# Patient Record
Sex: FEMALE | Race: WHITE | Employment: OTHER | URBAN - METROPOLITAN AREA
[De-identification: names, ages, dates, MRNs, and addresses within clinical notes are randomized per-mention and may not be internally consistent; named-entity substitution may affect disease eponyms.]

---

## 2019-12-31 NOTE — PROGRESS NOTES
Tami is a 24 year old No obstetric history on file. female who presents for annual exam.     Besides routine health maintenance, she has no other health concerns today .  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 Just finished school at [employer] in . Would like to go back on sprintec, used it as OCP and liked it, reports no issues. Due for cycle soon and will restart after that. Received HPV vaccine previously, never has had a pap before, would like STD testing.     Menses are regular q 28-30 days and normal lasting 5 days.   Menses flow: normal.    Patient's last menstrual period was 2019..   Using none for contraception.    She is not currently considering pregnancy.    REPRODUCTIVE/GYNECOLOGIC HISTORY:  Tami is sexually active with male partner(s) and is not currently in monogamous relationship.   STI testing offered?  Accepted  History of abnormal Pap smear:  NA  SOCIAL HISTORY  Abuse: does not report having previously been physical or sexually abused.    Do you feel safe in your environment? YES      She  reports that she has never smoked. She has never used smokeless tobacco.      Last PHQ-9 score on record =   PHQ-9 SCORE 1/3/2020   PHQ-9 Total Score 0     Last GAD7 score on record =   ILIANA-7 SCORE 1/3/2020   Total Score 0     Alcohol Score = 4    HEALTH MAINTENANCE:  Cholesterol: never   Mammo: never . History of abnormal Mammo: Not applicable.  Regular Self Breast Exams: Yes  TSH: (No results found for: TSH )  Pap;  never  Immunizations up to date: yes  (Gardasil, Tdap, Flu)  Health maintenance updated:  yes    HEALTHY HABITS  Eating habits: eats regular meals  Calcium/Vitamin D intake: source:  dairy Adequate?   Exercise: How often do you exercise? 3-5 times/week;Walking, Cardio and yoga  Have you had an eye exam in the last two years? YES    Do you routinely see the dentist once or twice yearly? YES    HISTORY:  OB History    Para Term  AB  Living   0 0 0 0 0 0   SAB TAB Ectopic Multiple Live Births   0 0 0 0 0     Past Medical History:   Diagnosis Date     NO ACTIVE PROBLEMS      Past Surgical History:   Procedure Laterality Date     NO HISTORY OF SURGERY       Family History   Problem Relation Age of Onset     Thyroid Disease Mother      Hypertension Paternal Grandmother      Diabetes Paternal Grandfather      Breast Cancer Maternal Aunt      Social History     Socioeconomic History     Marital status: Single     Spouse name: Not on file     Number of children: Not on file     Years of education: Not on file     Highest education level: Not on file   Occupational History     Not on file   Social Needs     Financial resource strain: Not on file     Food insecurity:     Worry: Not on file     Inability: Not on file     Transportation needs:     Medical: Not on file     Non-medical: Not on file   Tobacco Use     Smoking status: Not on file   Substance and Sexual Activity     Alcohol use: Not on file     Drug use: Not on file     Sexual activity: Not on file   Lifestyle     Physical activity:     Days per week: Not on file     Minutes per session: Not on file     Stress: Not on file   Relationships     Social connections:     Talks on phone: Not on file     Gets together: Not on file     Attends Sabianist service: Not on file     Active member of club or organization: Not on file     Attends meetings of clubs or organizations: Not on file     Relationship status: Not on file     Intimate partner violence:     Fear of current or ex partner: Not on file     Emotionally abused: Not on file     Physically abused: Not on file     Forced sexual activity: Not on file   Other Topics Concern     Not on file   Social History Narrative     Not on file       Current Outpatient Medications:      norgestimate-ethinyl estradiol (ORTHO-CYCLEN/SPRINTEC) 0.25-35 MG-MCG tablet, Take 1 tablet by mouth daily, Disp: 84 tablet, Rfl: 3   No Known Allergies    Past medical,  "surgical, social and family history were reviewed and updated in EPIC.    ROS:   12 point review of systems negative other than symptoms noted below or in the HPI.    PHYSICAL EXAM:  BP 92/68   Pulse 64   Ht 1.702 m (5' 7\")   Wt 56.2 kg (124 lb)   LMP 12/02/2019   BMI 19.42 kg/m     BMI: Body mass index is 19.42 kg/m .  Constitutional: healthy, alert and no distress  Neck: symmetrical, thyroid normal size, no masses present, no lymphadenopathy present.   Cardiovascular: RRR, no murmurs present  Respiratory: breathing unlabored, lungs CTA bilaterally  Breast:normal without masses, tenderness or nipple discharge and no palpable axillary masses or adenopathy  Gastrointestinal: abdomen soft, non-tender, bowel sounds present  PELVIC EXAM:  Vulva: No lesions, no adenopathy, BUS WNL  Vagina: Moist, pink, discharge normal  well rugated, no lesions  Cervix:smooth, pink, no visible lesions  Uterus: Normal size, non-tender, mobile  Ovaries: No masses palpated  Rectal exam: deferred  Skin: small, skin colored round lesions with defined margins present on pelvic/inner left thigh area, non tender    ASSESSMENT/PLAN:    ICD-10-CM    1. Encounter for gynecological examination with abnormal finding Z01.411 Pap imaged thin layer screen only - recommended age 21 - 24 years   2. Screen for STD (sexually transmitted disease) Z11.3 NEISSERIA GONORRHOEA PCR   3. Screening for chlamydial disease Z11.8 CHLAMYDIA TRACHOMATIS PCR   4. OCP (oral contraceptive pills) initiation Z30.011 norgestimate-ethinyl estradiol (ORTHO-CYCLEN/SPRINTEC) 0.25-35 MG-MCG tablet   5. Molluscum contagiosum B08.1 DERMATOLOGY REFERRAL     COUNSELING:   Reviewed preventive health counseling, as reflected in patient instructions       Regular exercise       Healthy diet/nutrition       Vision screening       Contraception       Safe sex practices/STD prevention   reports that she has never smoked. She has never used smokeless tobacco.   Reviewed pelvic exams, " showed patient speculum, reviewed pap guidelines  Discussed bumps on pelvic area/inner thigh are likely  molluscum contagiosum, uniform skin color appearance, recommend covering with bandage and avoiding intercourse until she can see dermatology  Referral placed for [facility]    In addition to the preventive visit, 10 minutes of the appointment were spent evaluating and developing a treatment plan for her additional concern(s).        KAREN De La Cruz, CNM

## 2020-01-03 ENCOUNTER — OFFICE VISIT (OUTPATIENT)
Dept: MIDWIFE SERVICES | Facility: CLINIC | Age: 25
End: 2020-01-03
Payer: COMMERCIAL

## 2020-01-03 VITALS
WEIGHT: 124 LBS | BODY MASS INDEX: 19.46 KG/M2 | SYSTOLIC BLOOD PRESSURE: 92 MMHG | HEART RATE: 64 BPM | HEIGHT: 67 IN | DIASTOLIC BLOOD PRESSURE: 68 MMHG

## 2020-01-03 DIAGNOSIS — Z01.411 ENCOUNTER FOR GYNECOLOGICAL EXAMINATION WITH ABNORMAL FINDING: Primary | ICD-10-CM

## 2020-01-03 DIAGNOSIS — Z30.011 OCP (ORAL CONTRACEPTIVE PILLS) INITIATION: ICD-10-CM

## 2020-01-03 DIAGNOSIS — Z11.8 SCREENING FOR CHLAMYDIAL DISEASE: ICD-10-CM

## 2020-01-03 DIAGNOSIS — Z11.3 SCREEN FOR STD (SEXUALLY TRANSMITTED DISEASE): ICD-10-CM

## 2020-01-03 DIAGNOSIS — B08.1 MOLLUSCUM CONTAGIOSUM: ICD-10-CM

## 2020-01-03 PROCEDURE — G0145 SCR C/V CYTO,THINLAYER,RESCR: HCPCS | Performed by: ADVANCED PRACTICE MIDWIFE

## 2020-01-03 PROCEDURE — 99385 PREV VISIT NEW AGE 18-39: CPT | Performed by: ADVANCED PRACTICE MIDWIFE

## 2020-01-03 PROCEDURE — 87591 N.GONORRHOEAE DNA AMP PROB: CPT | Performed by: ADVANCED PRACTICE MIDWIFE

## 2020-01-03 PROCEDURE — 99212 OFFICE O/P EST SF 10 MIN: CPT | Mod: 25 | Performed by: ADVANCED PRACTICE MIDWIFE

## 2020-01-03 PROCEDURE — 87491 CHLMYD TRACH DNA AMP PROBE: CPT | Performed by: ADVANCED PRACTICE MIDWIFE

## 2020-01-03 RX ORDER — NORGESTIMATE AND ETHINYL ESTRADIOL 0.25-0.035
1 KIT ORAL DAILY
Qty: 84 TABLET | Refills: 3 | Status: SHIPPED | OUTPATIENT
Start: 2020-01-03 | End: 2020-12-07

## 2020-01-03 ASSESSMENT — PATIENT HEALTH QUESTIONNAIRE - PHQ9
SUM OF ALL RESPONSES TO PHQ QUESTIONS 1-9: 0
5. POOR APPETITE OR OVEREATING: NOT AT ALL

## 2020-01-03 ASSESSMENT — ANXIETY QUESTIONNAIRES
5. BEING SO RESTLESS THAT IT IS HARD TO SIT STILL: NOT AT ALL
6. BECOMING EASILY ANNOYED OR IRRITABLE: NOT AT ALL
1. FEELING NERVOUS, ANXIOUS, OR ON EDGE: NOT AT ALL
7. FEELING AFRAID AS IF SOMETHING AWFUL MIGHT HAPPEN: NOT AT ALL
2. NOT BEING ABLE TO STOP OR CONTROL WORRYING: NOT AT ALL
3. WORRYING TOO MUCH ABOUT DIFFERENT THINGS: NOT AT ALL
GAD7 TOTAL SCORE: 0

## 2020-01-03 ASSESSMENT — MIFFLIN-ST. JEOR: SCORE: 1355.09

## 2020-01-03 NOTE — LETTER
January 9, 2020      Tami Ayala  [address] [address]    Dear ,      I am happy to inform you that your recent cervical cancer screening test (PAP smear) was normal.      Preventative screenings such as this help to ensure your health for years to come. You should repeat a pap smear in 3 years, unless otherwise directed.      You will still need to return to the clinic every year for your annual exam and other preventive tests.     If you have additional questions regarding this result, please call our registered nurse, Pretty at [phone].      Sincerely,      KAREN Fitzpatrick CNM

## 2020-01-03 NOTE — PATIENT INSTRUCTIONS
Preventive Health Recommendations  Female Ages 21 - 39  Yearly exam:   See your health care provider every year in order to  1. Review health changes.  2. Discuss preventive care.    3. Review your medicines if you your provider has prescribed any.      You do not need a Pap test if your uterus was removed (hysterectomy) and you have not had cancer.    You should be tested each year for STIs (sexually transmitted diseases) if you're at risk.     Talk to your provider about how often to have your cholesterol checked, about every 5 years if normal.    If you are at risk for diabetes, you should have a diabetes test (fasting glucose).    Vitamin D deficiency screening and thyroid disease screening is also recommended every 3-5 years depending on risk factors or more often if symptomatic  PAP Smear:   Until age 30: Get a Pap test every three years (more often if you have had an abnormal result)    After age 30: Talk to your provider about whether you should have a Pap test every 3 years or have a Pap test with HPV screening every 5 years.   Shots: Get a flu shot each year. Get a tetanus shot every 10 years.   Nutrition:     Eat at least 5 servings of fruits and vegetables each day    Eat REAL food, stay away from processed foods.    Eat whole-grain bread, whole-wheat pasta and brown rice instead of white grains and rice.    For bone health:  Eat calcium-rich foods or take calcium pills (500 to 600 mg) twice a day with food (1200 mg per day). Also take vitamin D (2000 IUs) each day.     Lifestyle    Exercise regularly (30 minutes a day, 5 days of the week). This will help you control your weight and prevent disease.    Weight bearing exercise such as weight lifting, walking, running and yoga will be beneficial later in life preventing osteoporosis     Limit alcohol to one drink per day.    No smoking.     Wear sunscreen to prevent skin cancer.    See your dentist every six months to one year for an exam and cleaning  depending on their recommendation    Get an eye exam every two years or more often if you wear glasses or contacts.      Birth Control Pills    Combination birth control pills contain both estrogen and progestin.  There are numerous brands of birth control pills otherwise known as oral contraceptive pills (OCP's).  Each brand has a different combination of estrogen and progestin so every woman can find the one that is right for her.  OCP's are a safe and effective way to prevent pregnancy in most women.    How do OCP's work  OCP's work by several different mechanisms.  They cause changes in the cervix and the lining of the uterus.  The cervical mucus becomes thicker which will prevent the sperm from entering the cervix.  The lining of the uterus becomes thin which helps prevent an egg from attaching to it.  In combination, these events make it unlikely that you will get pregnant. It may also prevent ovulation completely.    Benefits of OCP's  May reduce your risk of:  Cancer of the uterus and ovary, ovarian cysts, pelvic infection, bone loss, benign breast disease, anemia, ectopic pregnancy and acne.  It may also decrease symptoms of PCOS (Polycystic Ovarian Syndrome). OCP's may also improve cramping during menstrual cycle and may make you cycle shorter and lighter.    How to take OCP's  You have several choices on how to start taking your OCP's:    You can start the pill on the first day of your next period    You can start the pill on the Sunday after your next period starts    You can start the pill on the first day it was prescribed no matter where you are in your cycle.  In this case, you will need to make sure you are not pregnant.    No matter when you start your first pack, you will always start your next pack on the same day you started your first pack.    You should take the pill at the same time every day.  Do not skip any pills.  If you miss any pills, are taking antibiotics or vomit, use a backup method  of birth control until you get your next period.    Pills come in packs of 21, 28 or 91 pills:      21 Pills:  Take one pill at the same time every day for 21 days.  Wait 7 days before beginning your next pack.  During these 7 days you will have your period.    28 Pills:  Take one pill at the same time every day for 28 days.  The last 7 pills in the pack do not contain estrogen/progestin.  During these 7 days you will have your period.      91 Pills:  Take one pill at the same time every day for 91 days.  The last 7 pills in the pack do not contain estrogen/progestin.  During these 7 days you will have your period.  With this method you will only have 4 periods a year.  Some women eventually have no bleeding at all.    Each pill pack comes with instructions.  Please make sure you read them and understand these instructions.      What to do if you miss a pill    Occasionally you may forget to take a pill or not take it on time.  Take the missed pill as soon as you remember.  Take the next pill at the regular time.  It is ok if you take two pills in one day.  You may feel a bit queasy or have some spotting, this is normal and should not be concerning.  If you have missed more than one pill use a back up method of birth control and call the clinic for instructions on how to proceed.    Who should not take Combined OCP's    If you have a history or have blood clots    A history of cerebral vascular accident (stroke)    If you have ischemic heart or coronary artery disease    Known of suspected breast cancer    Known or suspected pregnancy    Smoker and over age 35    Any know liver abnormality    Migraine headaches with an aura    Undiagnosed abnormal vaginal bleeding    High blood pressure    Common side effects when starting OCP's  Headache, nausea, dizziness, breakthrough bleeding, missed periods, tender breasts, depression and anxiety.  Most side effects are minor and resolve in the first few months. Take the pill  with meals or at bedtime if nausea occurs.    Call or return for care in the following circumstances:      Unexpected missed periods or very heavy bleeding    Persistent vaginal bleeding    Depression    Suspected pregnancy    Persistent side effects such as:  Nausea, irregular menses or mood changes.    Seek emergency care immediately for the following:  ACHES    Abdominal or pelvic pain    Chest pain    Severe headache     Visual disturbances    Severe leg pain or numbness or tingling of extremities    Lastly-    Use of a backup method is recommended for the first cycle    Condoms are recommended to protect against STI's    OCP's are 99% effective if take correctly.    The pill helps to keep your periods regular, lighter and shorter and reduces cramps.  If you desire a pregnancy, you may stop taking your OCPs.     Please call the clinic with questions and concerns  [facility]  [phone]

## 2020-01-04 ASSESSMENT — ANXIETY QUESTIONNAIRES: GAD7 TOTAL SCORE: 0

## 2020-01-07 LAB
C TRACH DNA SPEC QL NAA+PROBE: NEGATIVE
N GONORRHOEA DNA SPEC QL NAA+PROBE: NEGATIVE
SPECIMEN SOURCE: NORMAL
SPECIMEN SOURCE: NORMAL

## 2020-01-08 LAB
COPATH REPORT: NORMAL
PAP: NORMAL

## 2020-06-10 ENCOUNTER — WALK IN (OUTPATIENT)
Dept: URGENT CARE | Age: 25
End: 2020-06-10

## 2020-06-10 DIAGNOSIS — H66.92 LEFT OTITIS MEDIA, UNSPECIFIED OTITIS MEDIA TYPE: ICD-10-CM

## 2020-06-10 DIAGNOSIS — H72.92 PERFORATION OF LEFT TYMPANIC MEMBRANE: Primary | ICD-10-CM

## 2020-06-10 PROCEDURE — 99213 OFFICE O/P EST LOW 20 MIN: CPT | Performed by: PHYSICIAN ASSISTANT

## 2020-06-10 RX ORDER — AMOXICILLIN 875 MG/1
875 TABLET, COATED ORAL 2 TIMES DAILY
Qty: 20 TABLET | Refills: 0 | Status: SHIPPED | OUTPATIENT
Start: 2020-06-10 | End: 2020-06-20

## 2020-06-10 RX ORDER — OFLOXACIN 3 MG/ML
SOLUTION AURICULAR (OTIC)
Qty: 5 ML | Refills: 0 | Status: SHIPPED | OUTPATIENT
Start: 2020-06-10

## 2020-06-10 ASSESSMENT — PAIN SCALES - GENERAL: PAINLEVEL: 5-6

## 2020-12-05 DIAGNOSIS — Z30.011 OCP (ORAL CONTRACEPTIVE PILLS) INITIATION: ICD-10-CM

## 2020-12-07 RX ORDER — NORGESTIMATE AND ETHINYL ESTRADIOL 0.25-0.035
1 KIT ORAL DAILY
Qty: 84 TABLET | Refills: 0 | Status: SHIPPED | OUTPATIENT
Start: 2020-12-07 | End: 2021-03-01

## 2020-12-07 NOTE — TELEPHONE ENCOUNTER
Medication is being filled for 1 time refill only due to:  Patient needs to be seen because due for annual in January.  Tiarra Capps RN on 12/7/2020 at 8:55 AM

## 2020-12-07 NOTE — TELEPHONE ENCOUNTER
"Requested Prescriptions   Pending Prescriptions Disp Refills     ESTARYLLA 0.25-35 MG-MCG tablet [Pharmacy Med Name: ESTARYLLA TABLETS 28S] 84 tablet 3     Sig: TAKE 1 TABLET BY MOUTH DAILY       Contraceptives Protocol Passed - 12/5/2020  3:30 AM        Passed - Patient is not a current smoker if age is 35 or older        Passed - Recent (12 mo) or future (30 days) visit within the authorizing provider's specialty     Patient has had an office visit with the authorizing provider or a provider within the authorizing providers department within the previous 12 mos or has a future within next 30 days. See \"Patient Info\" tab in inbasket, or \"Choose Columns\" in Meds & Orders section of the refill encounter.              Passed - Medication is active on med list        Passed - No active pregnancy on record        Passed - No positive pregnancy test in past 12 months           Last Written Prescription Date:  1/3/20  Last Fill Quantity: 84,  # refills: 3   Last office visit: 1/3/2020 with prescribing provider:  Katharina Coronado CNM   Future Office Visit:  None          "

## 2021-03-01 DIAGNOSIS — Z30.011 OCP (ORAL CONTRACEPTIVE PILLS) INITIATION: ICD-10-CM

## 2021-03-01 RX ORDER — NORGESTIMATE AND ETHINYL ESTRADIOL 0.25-0.035
KIT ORAL
Qty: 28 TABLET | Refills: 0 | Status: SHIPPED | OUTPATIENT
Start: 2021-03-01 | End: 2021-06-24

## 2021-03-01 NOTE — TELEPHONE ENCOUNTER
"Requested Prescriptions   Pending Prescriptions Disp Refills     ESTARYLLA 0.25-35 MG-MCG tablet [Pharmacy Med Name: ESTARYLLA TABLETS 28S] 84 tablet 0     Sig: TAKE 1 TABLET BY MOUTH DAILY       Contraceptives Protocol Failed - 3/1/2021  3:32 AM        Failed - Recent (12 mo) or future (30 days) visit within the authorizing provider's specialty     Patient has had an office visit with the authorizing provider or a provider within the authorizing providers department within the previous 12 mos or has a future within next 30 days. See \"Patient Info\" tab in inbasket, or \"Choose Columns\" in Meds & Orders section of the refill encounter.              Passed - Patient is not a current smoker if age is 35 or older        Passed - Medication is active on med list        Passed - No active pregnancy on record        Passed - No positive pregnancy test in past 12 months           Medication is being filled for 1 time refill only due to:  appointment needed for further refills   Petra Fong RN on 3/1/2021 at 8:28 AM      "

## 2021-04-03 DIAGNOSIS — Z30.011 OCP (ORAL CONTRACEPTIVE PILLS) INITIATION: ICD-10-CM

## 2021-04-04 RX ORDER — NORGESTIMATE AND ETHINYL ESTRADIOL 0.25-0.035
KIT ORAL
Qty: 28 TABLET | Refills: 0 | OUTPATIENT
Start: 2021-04-04

## 2021-04-04 NOTE — TELEPHONE ENCOUNTER
"Requested Prescriptions   Pending Prescriptions Disp Refills     JUVENCIO 0.25-35 MG-MCG tablet [Pharmacy Med Name: JUVENCIO TABLETS 28S] 28 tablet 0     Sig: TAKE 1 TABLET BY MOUTH DAILY       Contraceptives Protocol Failed - 4/3/2021  3:32 AM        Failed - Recent (12 mo) or future (30 days) visit within the authorizing provider's specialty     Patient has had an office visit with the authorizing provider or a provider within the authorizing providers department within the previous 12 mos or has a future within next 30 days. See \"Patient Info\" tab in inbasket, or \"Choose Columns\" in Meds & Orders section of the refill encounter.              Passed - Patient is not a current smoker if age is 35 or older        Passed - Medication is active on med list        Passed - No active pregnancy on record        Passed - No positive pregnancy test in past 12 months           Last Written Prescription Date:  3/1/21  Last Fill Quantity: 28,  # refills: 0   Last office visit: 1/3/2020 with prescribing provider:  ERIC Coronado CNM   Future Office Visit:      Pt due for annual, no appt scheduled. Pt already received one month extension. Rx denied.   Kelsi Dooley RN on 4/4/2021 at 9:12 AM        "

## 2021-04-12 DIAGNOSIS — Z30.011 OCP (ORAL CONTRACEPTIVE PILLS) INITIATION: ICD-10-CM

## 2021-04-12 RX ORDER — NORGESTIMATE AND ETHINYL ESTRADIOL 0.25-0.035
KIT ORAL
Qty: 28 TABLET | Refills: 0 | OUTPATIENT
Start: 2021-04-12

## 2021-04-12 NOTE — TELEPHONE ENCOUNTER
"Requested Prescriptions   Pending Prescriptions Disp Refills     JUVENCIO 0.25-35 MG-MCG tablet [Pharmacy Med Name: JUVENCIO TABLETS 28S] 28 tablet 0     Sig: TAKE 1 TABLET BY MOUTH DAILY       Contraceptives Protocol Failed - 4/12/2021 12:34 PM        Failed - Recent (12 mo) or future (30 days) visit within the authorizing provider's specialty     Patient has had an office visit with the authorizing provider or a provider within the authorizing providers department within the previous 12 mos or has a future within next 30 days. See \"Patient Info\" tab in inbasket, or \"Choose Columns\" in Meds & Orders section of the refill encounter.              Passed - Patient is not a current smoker if age is 35 or older        Passed - Medication is active on med list        Passed - No active pregnancy on record        Passed - No positive pregnancy test in past 12 months           Pt due for annual, no appt scheduled. Pt already received one month extension. Rx denied.   Petra Fong RN on 4/12/2021 at 12:41 PM    "

## 2021-05-24 VITALS
DIASTOLIC BLOOD PRESSURE: 70 MMHG | OXYGEN SATURATION: 99 % | BODY MASS INDEX: 19.93 KG/M2 | HEIGHT: 67 IN | TEMPERATURE: 97.9 F | SYSTOLIC BLOOD PRESSURE: 112 MMHG | HEART RATE: 66 BPM | RESPIRATION RATE: 16 BRPM | WEIGHT: 127 LBS

## 2021-06-21 ENCOUNTER — MYC REFILL (OUTPATIENT)
Dept: MIDWIFE SERVICES | Facility: CLINIC | Age: 26
End: 2021-06-21

## 2021-06-21 DIAGNOSIS — Z30.011 OCP (ORAL CONTRACEPTIVE PILLS) INITIATION: ICD-10-CM

## 2021-06-21 RX ORDER — NORGESTIMATE AND ETHINYL ESTRADIOL 0.25-0.035
1 KIT ORAL DAILY
Qty: 28 TABLET | Refills: 0 | OUTPATIENT
Start: 2021-06-21

## 2021-06-21 NOTE — TELEPHONE ENCOUNTER
"Requested Prescriptions   Pending Prescriptions Disp Refills     norgestimate-ethinyl estradiol (ESTARYLLA) 0.25-35 MG-MCG tablet 28 tablet 0     Sig: Take 1 tablet by mouth daily       Contraceptives Protocol Failed - 6/21/2021  9:10 AM        Failed - Recent (12 mo) or future (30 days) visit within the authorizing provider's specialty     Patient has had an office visit with the authorizing provider or a provider within the authorizing providers department within the previous 12 mos or has a future within next 30 days. See \"Patient Info\" tab in inbasket, or \"Choose Columns\" in Meds & Orders section of the refill encounter.              Passed - Patient is not a current smoker if age is 35 or older        Passed - Medication is active on med list        Passed - No active pregnancy on record        Passed - No positive pregnancy test in past 12 months           Rx denied  Appointment needed for further refills  Petra Fong RN on 6/21/2021 at 9:34 AM    "

## 2021-06-24 ENCOUNTER — OFFICE VISIT (OUTPATIENT)
Dept: MIDWIFE SERVICES | Facility: CLINIC | Age: 26
End: 2021-06-24
Payer: COMMERCIAL

## 2021-06-24 VITALS
WEIGHT: 122.4 LBS | HEART RATE: 74 BPM | BODY MASS INDEX: 19.21 KG/M2 | HEIGHT: 67 IN | DIASTOLIC BLOOD PRESSURE: 56 MMHG | SYSTOLIC BLOOD PRESSURE: 100 MMHG

## 2021-06-24 DIAGNOSIS — Z11.8 SCREENING FOR CHLAMYDIAL DISEASE: ICD-10-CM

## 2021-06-24 DIAGNOSIS — Z30.011 OCP (ORAL CONTRACEPTIVE PILLS) INITIATION: ICD-10-CM

## 2021-06-24 DIAGNOSIS — Z11.3 SCREEN FOR STD (SEXUALLY TRANSMITTED DISEASE): Primary | ICD-10-CM

## 2021-06-24 PROCEDURE — 99395 PREV VISIT EST AGE 18-39: CPT | Performed by: ADVANCED PRACTICE MIDWIFE

## 2021-06-24 PROCEDURE — 87591 N.GONORRHOEAE DNA AMP PROB: CPT | Performed by: ADVANCED PRACTICE MIDWIFE

## 2021-06-24 PROCEDURE — 87491 CHLMYD TRACH DNA AMP PROBE: CPT | Performed by: ADVANCED PRACTICE MIDWIFE

## 2021-06-24 RX ORDER — NORGESTIMATE AND ETHINYL ESTRADIOL 0.25-0.035
1 KIT ORAL DAILY
Qty: 140 TABLET | Refills: 3 | Status: SHIPPED | OUTPATIENT
Start: 2021-06-24 | End: 2022-06-24

## 2021-06-24 ASSESSMENT — ANXIETY QUESTIONNAIRES
IF YOU CHECKED OFF ANY PROBLEMS ON THIS QUESTIONNAIRE, HOW DIFFICULT HAVE THESE PROBLEMS MADE IT FOR YOU TO DO YOUR WORK, TAKE CARE OF THINGS AT HOME, OR GET ALONG WITH OTHER PEOPLE: NOT DIFFICULT AT ALL
1. FEELING NERVOUS, ANXIOUS, OR ON EDGE: SEVERAL DAYS
7. FEELING AFRAID AS IF SOMETHING AWFUL MIGHT HAPPEN: NOT AT ALL
6. BECOMING EASILY ANNOYED OR IRRITABLE: NOT AT ALL
GAD7 TOTAL SCORE: 4
3. WORRYING TOO MUCH ABOUT DIFFERENT THINGS: SEVERAL DAYS
2. NOT BEING ABLE TO STOP OR CONTROL WORRYING: SEVERAL DAYS
5. BEING SO RESTLESS THAT IT IS HARD TO SIT STILL: NOT AT ALL

## 2021-06-24 ASSESSMENT — PATIENT HEALTH QUESTIONNAIRE - PHQ9
SUM OF ALL RESPONSES TO PHQ QUESTIONS 1-9: 0
5. POOR APPETITE OR OVEREATING: SEVERAL DAYS

## 2021-06-24 ASSESSMENT — MIFFLIN-ST. JEOR: SCORE: 1342.83

## 2021-06-24 NOTE — PROGRESS NOTES
Tami is a 25 year old  female who presents for annual exam.     Besides routine health maintenance, she has no other health concerns today .  HPI:  Last year has been a little stressful, mostly with new sales job. Has a supportive roommate, and supportive family (but not in town). Got a new dog!  Menses are regular q 28-30 days and normal and regular lasting 5 days.   Menses flow: normal.    Patient's last menstrual period was 2021..   Using oral contraceptives for contraception.    She is not currently considering pregnancy.    REPRODUCTIVE/GYNECOLOGIC HISTORY:  Tami is sexually active with male partner(s) and is not currently in monogamous relationship.   STI testing offered?  Accepts  History of abnormal Pap smear:  No  SOCIAL HISTORY  Wears seatbelt  No bike  Wears sunscreen  No guns in the house  Safe in her relationships    She  reports that she has never smoked. She has never used smokeless tobacco.      Last PHQ-9 score on record =   PHQ-9 SCORE 2021   PHQ-9 Total Score 0     Last GAD7 score on record =   ILIANA-7 SCORE 2021   Total Score 4     Alcohol Score = 4    HEALTH MAINTENANCE:  History of abnormal lipids: No  Mammo: N/a . History of abnormal Mammo: Not applicable, Age 42.  Regular Self Breast Exams: Yes  Pap;   Lab Results   Component Value Date    PAP NIL 2020     Immunizations up to date: Unsure of Tdap, will check with PCP. Needs to get COVID-19 vaccine, aware, willing.  Health maintenance updated:  yes    HEALTHY HABITS  Eating habits: follows a balanced nutrition diet and has adequate calcium intake  Calcium/Vitamin D intake: source:  dairy Adequate?   Exercise: How often do you exercise? 5-7 times/week; Walking and pilates      HISTORY:  OB History    Para Term  AB Living   0 0 0 0 0 0   SAB TAB Ectopic Multiple Live Births   0 0 0 0 0     Past Medical History:   Diagnosis Date     NO ACTIVE PROBLEMS      Past Surgical History:   Procedure Laterality  Date     NO HISTORY OF SURGERY       Family History   Problem Relation Age of Onset     Thyroid Disease Mother      Hypertension Paternal Grandmother      Diabetes Paternal Grandfather      Breast Cancer Maternal Aunt      Social History     Socioeconomic History     Marital status: Single     Spouse name: Not on file     Number of children: Not on file     Years of education: Not on file     Highest education level: Not on file   Occupational History     Not on file   Social Needs     Financial resource strain: Not on file     Food insecurity     Worry: Not on file     Inability: Not on file     Transportation needs     Medical: Not on file     Non-medical: Not on file   Tobacco Use     Smoking status: Never Smoker     Smokeless tobacco: Never Used   Substance and Sexual Activity     Alcohol use: Yes     Drug use: Never     Sexual activity: Yes     Partners: Male     Birth control/protection: None   Lifestyle     Physical activity     Days per week: Not on file     Minutes per session: Not on file     Stress: Not on file   Relationships     Social connections     Talks on phone: Not on file     Gets together: Not on file     Attends Latter-day service: Not on file     Active member of club or organization: Not on file     Attends meetings of clubs or organizations: Not on file     Relationship status: Not on file     Intimate partner violence     Fear of current or ex partner: Not on file     Emotionally abused: Not on file     Physically abused: Not on file     Forced sexual activity: Not on file   Other Topics Concern     Parent/sibling w/ CABG, MI or angioplasty before 65F 55M? Not Asked   Social History Narrative     Not on file       Current Outpatient Medications:      ESTARYLLA 0.25-35 MG-MCG tablet, TAKE 1 TABLET BY MOUTH DAILY, Disp: 28 tablet, Rfl: 0   No Known Allergies    Past medical, surgical, social and family history were reviewed and updated in EPIC.    ROS:   12 point review of systems negative  "other than symptoms noted below or in the HPI.    PHYSICAL EXAM:  /56   Pulse 74   Ht 1.702 m (5' 7\")   Wt 55.5 kg (122 lb 6.4 oz)   LMP 06/08/2021   Breastfeeding No   BMI 19.17 kg/m     BMI: Body mass index is 19.17 kg/m .  Constitutional: healthy, alert and no distress  Neck: symmetrical  Cardiovascular: well-perfused on room air  Respiratory: breathing unlabored  Breast: no concerns, deferred  PELVIC EXAM:  Not indicated, deferred    ASSESSMENT/PLAN:    ICD-10-CM    1. OCP (oral contraceptive pills) initiation  Z30.011 norgestimate-ethinyl estradiol (ESTARYLLA) 0.25-35 MG-MCG tablet     No results found for any visits on 06/24/21.      COUNSELING:   -Reviewed option to take OCP continuously-- Tami will consider. Ordered sufficient refills to try this method if desired.  -Advised checking vaccine records for last tdap and if HPV series completed (only one noted in Care Everywhere)-- Tami will look into this  -Tami will look into counseling options for anxiety-- roommate will be a good support for this  -Accepts GC/CT today-- self-collect  -Strongly encouraged Tami to complete her COVID-19 vaccination-- she is not opposed, just \"haven't gotten around to it.\"  Reviewed preventive health counseling, as reflected in patient instructions  Special attention given to:        Regular exercise       Healthy diet/nutrition       Contraception       Safe sex practices/STD prevention   reports that she has never smoked. She has never used smokeless tobacco.    Kelsi Gongora, KAREN, CNM    "

## 2021-06-25 ASSESSMENT — ANXIETY QUESTIONNAIRES: GAD7 TOTAL SCORE: 4

## 2021-06-26 NOTE — RESULT ENCOUNTER NOTE
Natalie Garrett,    Your chlamydia and gonorrhea tests are negative. Please contact us with any questions or concerns.     Thank you,  Farzad Marion, DEANNA, APRN, CNM

## 2021-10-10 ENCOUNTER — HEALTH MAINTENANCE LETTER (OUTPATIENT)
Age: 26
End: 2021-10-10

## 2021-10-25 NOTE — PROGRESS NOTES
SUBJECTIVE: Tami Ayala is a 26 year old female who presents today with UTI symptoms: over the last year has had an increase urge to urinate, is urinating at least every hour during the day and at least 4 times during the night. When she does urinate, the output is little. Does not have any pain, doesn't feel like she is overdrinking, actually thinks she could increase fluids. During day feels like she has to pee urgently as soon as she feels the urge. No burning, low abdominal pain. She does leak urine if she doesn't go right away. Symptoms for the last year.   URINARY TRACT SYMPTOMS     Onset: 1 year ago    Description:   Painful urination (Dysuria): No  Blood in urine (Hematuria): No  Urgency/Frequency: Yes    Progression of Symptoms:  same    Accompanying Signs & Symptoms:  Fever/chills: No  Flank pain: No  Nausea and vomiting: No  Any vaginal symptoms: No  Abdominal/Pelvic Pain: No   History:   History of frequent UTI's: No  History of kidney stones: No  Sexually Active: Yes-last IC 2 weeks ago  Possibility of pregnancy: Don't Know  Contraceptive type:  oral contraceptive-continuous dosing; last menses was in July    Precipitating factors:   none         Therapies Tried and outcome: none      Patient Active Problem List   Diagnosis     OCP (oral contraceptive pills) initiation     Past Medical History:   Diagnosis Date     NO ACTIVE PROBLEMS      Past Surgical History:   Procedure Laterality Date     NO HISTORY OF SURGERY       Current Outpatient Medications   Medication Sig Dispense Refill     nitroFURantoin macrocrystal-monohydrate (MACROBID) 100 MG capsule Take 1 capsule (100 mg) by mouth 2 times daily for 5 days 10 capsule 0     norgestimate-ethinyl estradiol (ESTARYLLA) 0.25-35 MG-MCG tablet Take 1 tablet by mouth daily Skip placebo weeks. 140 tablet 3     No Known Allergies    Health maintenance updated:  yes    ROS:  12 point review of systems negative other than symptoms noted below or in the  "HPI.  Genitourinary: Frequency and Urgency    PHYSICAL EXAM:  Vitals: /68   Pulse 72   Ht 1.702 m (5' 7\")   Wt 55.8 kg (123 lb)   BMI 19.26 kg/m    BMI= Body mass index is 19.26 kg/m .  Patient appears well, afebrile.   ABD: Soft without supra pubic pain or tenderness  BACK: Neg CVAT.     UA RESULTS:  Recent Labs   Lab Test 10/26/21  0948   COLOR Yellow   APPEARANCE Clear   URINEGLC Negative   URINEBILI Negative   URINEKETONE Negative   SG 1.025   UBLD Moderate*   URINEPH 5.0   PROTEIN Negative   UROBILINOGEN 0.2   NITRITE Negative   LEUKEST Moderate*   RBCU 10-25*   WBCU 5-10*         ASSESSMENT/PLAN:      ICD-10-CM    1. Dysuria  R30.0 UA with Microscopic     Urine Culture     UA with Microscopic     Urine Microscopic Exam     Urine Culture     HCG Qual, Urine ([id number])     HCG Qual, Urine ([id number])   2. Acute cystitis with hematuria  N30.01 nitroFURantoin macrocrystal-monohydrate (MACROBID) 100 MG capsule     Results for orders placed or performed in visit on 10/26/21   UA with Microscopic     Status: Abnormal   Result Value Ref Range    Color Urine Yellow Colorless, Straw, Light Yellow, Yellow    Appearance Urine Clear Clear    Glucose Urine Negative Negative mg/dL    Bilirubin Urine Negative Negative    Ketones Urine Negative Negative mg/dL    Specific Gravity Urine 1.025 1.003 - 1.035    Blood Urine Moderate (A) Negative    pH Urine 5.0 5.0 - 7.0    Protein Albumin Urine Negative Negative mg/dL    Urobilinogen Urine 0.2 0.2, 1.0 E.U./dL    Nitrite Urine Negative Negative    Leukocyte Esterase Urine Moderate (A) Negative   Urine Microscopic Exam     Status: Abnormal   Result Value Ref Range    RBC Urine 10-25 (A) 0-2 /HPF /HPF    WBC Urine 5-10 (A) 0-5 /HPF /HPF   HCG Qual, Urine ([id number])     Status: Normal   Result Value Ref Range    hCG Urine Qualitative Negative Negative       Urine was sent for culture.     COUNSELING:  Push fluids    May use Uristat or other OTC med for dysuria  Reinforced the " importance of completing this course of antibiotics   Use a probiotic when taking antibiotics  Handout provided regarding UTI prevention  RTC with continued symptoms or concerns.    Oni Cid CNM

## 2021-10-26 ENCOUNTER — OFFICE VISIT (OUTPATIENT)
Dept: MIDWIFE SERVICES | Facility: CLINIC | Age: 26
End: 2021-10-26
Payer: COMMERCIAL

## 2021-10-26 VITALS
HEART RATE: 72 BPM | BODY MASS INDEX: 19.3 KG/M2 | WEIGHT: 123 LBS | SYSTOLIC BLOOD PRESSURE: 102 MMHG | DIASTOLIC BLOOD PRESSURE: 68 MMHG | HEIGHT: 67 IN

## 2021-10-26 DIAGNOSIS — R30.0 DYSURIA: Primary | ICD-10-CM

## 2021-10-26 DIAGNOSIS — N30.01 ACUTE CYSTITIS WITH HEMATURIA: ICD-10-CM

## 2021-10-26 LAB
ALBUMIN UR-MCNC: NEGATIVE MG/DL
APPEARANCE UR: CLEAR
BILIRUB UR QL STRIP: NEGATIVE
COLOR UR AUTO: YELLOW
GLUCOSE UR STRIP-MCNC: NEGATIVE MG/DL
HCG UR QL: NEGATIVE
HGB UR QL STRIP: ABNORMAL
KETONES UR STRIP-MCNC: NEGATIVE MG/DL
LEUKOCYTE ESTERASE UR QL STRIP: ABNORMAL
NITRATE UR QL: NEGATIVE
PH UR STRIP: 5 [PH] (ref 5–7)
RBC #/AREA URNS AUTO: ABNORMAL /HPF
SP GR UR STRIP: 1.02 (ref 1–1.03)
UROBILINOGEN UR STRIP-ACNC: 0.2 E.U./DL
WBC #/AREA URNS AUTO: ABNORMAL /HPF

## 2021-10-26 PROCEDURE — 99213 OFFICE O/P EST LOW 20 MIN: CPT | Performed by: ADVANCED PRACTICE MIDWIFE

## 2021-10-26 PROCEDURE — 81025 URINE PREGNANCY TEST: CPT | Performed by: ADVANCED PRACTICE MIDWIFE

## 2021-10-26 PROCEDURE — 81001 URINALYSIS AUTO W/SCOPE: CPT | Performed by: ADVANCED PRACTICE MIDWIFE

## 2021-10-26 PROCEDURE — 87086 URINE CULTURE/COLONY COUNT: CPT | Performed by: ADVANCED PRACTICE MIDWIFE

## 2021-10-26 RX ORDER — NITROFURANTOIN 25; 75 MG/1; MG/1
100 CAPSULE ORAL 2 TIMES DAILY
Qty: 10 CAPSULE | Refills: 0 | Status: SHIPPED | OUTPATIENT
Start: 2021-10-26 | End: 2021-10-31

## 2021-10-26 ASSESSMENT — MIFFLIN-ST. JEOR: SCORE: 1340.55

## 2021-10-27 LAB — BACTERIA UR CULT: NO GROWTH

## 2021-11-11 ENCOUNTER — TELEPHONE (OUTPATIENT)
Dept: MIDWIFE SERVICES | Facility: CLINIC | Age: 26
End: 2021-11-11

## 2021-11-11 NOTE — TELEPHONE ENCOUNTER
Pt was here 10/26 for UTI symptoms - labs came back negative for UTI - prescribed 5 days of macrobid. Symptoms went away but came back last night - no pain but frequency increase

## 2021-11-11 NOTE — TELEPHONE ENCOUNTER
Attempted to call pt and vm box full and unable to leave a message.    Kelsi Dooley RN on 11/11/2021 at 9:15 AM

## 2021-11-16 NOTE — PROGRESS NOTES
"SUBJECTIVE: Tami Ayala is a 26 year old female who presents today with UTI symptoms: Pt states that she has been going to the bathroom more frequently. She states that she has to go to the bathroom 2 times a night. She states that she doesnt drink a lot of water.      URINARY TRACT SYMPTOMS     Onset: In the Spring. She was seen for these same symptoms in October.    Description:   Painful urination (Dysuria): No  Blood in urine (Hematuria): No  Urgency/Frequency: Yes Reports when voiding she voids a good amount each time.   Was on a 5 hour car trip a few weeks ago and had to stop 6 times to void. Feels she is voiding every 1-2 hours.  Voids twice overnight  States experiencing incontinence if she does not void almost immediately after feeling the need    Progression of Symptoms:  same and constant    Accompanying Signs & Symptoms:  Fever/chills: No  Flank pain: No  Nausea and vomiting: No  Any vaginal symptoms: No  Abdominal/Pelvic Pain: No   History:   History of frequent UTI's: No  History of kidney stones: No  Sexually Active: Yes  Possibility of pregnancy: No. No history of pregnancy/birth  Contraceptive type:  condoms and oral contraceptive    Precipitating factors:            Therapies Tried and outcome:       Patient Active Problem List   Diagnosis     OCP (oral contraceptive pills) initiation     Past Medical History:   Diagnosis Date     NO ACTIVE PROBLEMS      Past Surgical History:   Procedure Laterality Date     NO HISTORY OF SURGERY       Current Outpatient Medications   Medication Sig Dispense Refill     norgestimate-ethinyl estradiol (ESTARYLLA) 0.25-35 MG-MCG tablet Take 1 tablet by mouth daily Skip placebo weeks. 140 tablet 3     No Known Allergies    Health maintenance updated:  yes    ROS:  12 point review of systems negative other than symptoms noted below or in the HPI.  Genitourinary: Frequency    PHYSICAL EXAM:  Vitals: /64   Ht 1.702 m (5' 7\")   Wt 57.2 kg (126 lb 3.2 oz)   " LMP 10/27/2021 (Exact Date)   Breastfeeding No   BMI 19.77 kg/m    BMI= Body mass index is 19.77 kg/m .  Patient appears well, afebrile.   Exam deferred    No components found for: URINE        ASSESSMENT/PLAN:      ICD-10-CM    1. Urinary urgency  R39.15 Adult Urology Referral   2. Dysuria  R30.0 UA with Microscopic - lab collect     Urine Culture Aerobic Bacterial - lab collect     Urine Microscopic Exam   3. Other urinary incontinence  N39.498 Adult Urology Referral   4. Urinary frequency  R35.0 Adult Urology Referral     Results for orders placed or performed in visit on 11/17/21   UA with Microscopic - lab collect     Status: Abnormal   Result Value Ref Range    Color Urine Yellow Colorless, Straw, Light Yellow, Yellow    Appearance Urine Clear Clear    Glucose Urine Negative Negative mg/dL    Bilirubin Urine Negative Negative    Ketones Urine 40  (A) Negative mg/dL    Specific Gravity Urine >=1.030 1.003 - 1.035    Blood Urine Negative Negative    pH Urine 6.0 5.0 - 7.0    Protein Albumin Urine Negative Negative mg/dL    Urobilinogen Urine 0.2 0.2, 1.0 E.U./dL    Nitrite Urine Negative Negative    Leukocyte Esterase Urine Negative Negative   Urine Microscopic Exam     Status: Abnormal   Result Value Ref Range    Bacteria Urine Few (A) None Seen /HPF    RBC Urine 0-2 0-2 /HPF /HPF    WBC Urine 0-5 0-5 /HPF /HPF    Squamous Epithelials Urine Moderate (A) None Seen /LPF    Mucus Urine Present (A) None Seen /LPF   Urine Culture Aerobic Bacterial - lab collect     Status: None (Preliminary result)    Specimen: Urine, Midstream   Result Value Ref Range    Culture No growth, less than 1 day          COUNSELING:  Explained because her symptoms have been occurring since early spring there is likely an underlying concern  Recommended a consult with urology. Referral placed  UA/UCX sent. Offered further kidney function labs but discussed the Urologist may want additional lab work done and it would be acceptable to  wait    15 minutes spent on the date of the encounter doing chart review, patient visit and documentation       Yesi JONES CNM

## 2021-11-17 ENCOUNTER — OFFICE VISIT (OUTPATIENT)
Dept: MIDWIFE SERVICES | Facility: CLINIC | Age: 26
End: 2021-11-17
Payer: COMMERCIAL

## 2021-11-17 VITALS
WEIGHT: 126.2 LBS | DIASTOLIC BLOOD PRESSURE: 64 MMHG | SYSTOLIC BLOOD PRESSURE: 100 MMHG | BODY MASS INDEX: 19.81 KG/M2 | HEIGHT: 67 IN

## 2021-11-17 DIAGNOSIS — R39.15 URINARY URGENCY: Primary | ICD-10-CM

## 2021-11-17 DIAGNOSIS — R35.0 URINARY FREQUENCY: ICD-10-CM

## 2021-11-17 DIAGNOSIS — R30.0 DYSURIA: Primary | ICD-10-CM

## 2021-11-17 DIAGNOSIS — N39.498 OTHER URINARY INCONTINENCE: ICD-10-CM

## 2021-11-17 DIAGNOSIS — R30.0 DYSURIA: ICD-10-CM

## 2021-11-17 LAB
ALBUMIN UR-MCNC: NEGATIVE MG/DL
APPEARANCE UR: CLEAR
BACTERIA #/AREA URNS HPF: ABNORMAL /HPF
BILIRUB UR QL STRIP: NEGATIVE
COLOR UR AUTO: YELLOW
GLUCOSE UR STRIP-MCNC: NEGATIVE MG/DL
HGB UR QL STRIP: NEGATIVE
KETONES UR STRIP-MCNC: 40 MG/DL
LEUKOCYTE ESTERASE UR QL STRIP: NEGATIVE
MUCOUS THREADS #/AREA URNS LPF: PRESENT /LPF
NITRATE UR QL: NEGATIVE
PH UR STRIP: 6 [PH] (ref 5–7)
RBC #/AREA URNS AUTO: ABNORMAL /HPF
SP GR UR STRIP: >=1.03 (ref 1–1.03)
SQUAMOUS #/AREA URNS AUTO: ABNORMAL /LPF
UROBILINOGEN UR STRIP-ACNC: 0.2 E.U./DL
WBC #/AREA URNS AUTO: ABNORMAL /HPF

## 2021-11-17 PROCEDURE — 99213 OFFICE O/P EST LOW 20 MIN: CPT | Performed by: ADVANCED PRACTICE MIDWIFE

## 2021-11-17 PROCEDURE — 81001 URINALYSIS AUTO W/SCOPE: CPT | Performed by: ADVANCED PRACTICE MIDWIFE

## 2021-11-17 PROCEDURE — 87086 URINE CULTURE/COLONY COUNT: CPT | Performed by: ADVANCED PRACTICE MIDWIFE

## 2021-11-17 ASSESSMENT — MIFFLIN-ST. JEOR: SCORE: 1355.07

## 2021-11-18 PROBLEM — R35.0 URINARY FREQUENCY: Status: ACTIVE | Noted: 2021-11-18

## 2021-11-18 PROBLEM — R39.15 URINARY URGENCY: Status: ACTIVE | Noted: 2021-11-18

## 2021-11-18 LAB — BACTERIA UR CULT: NO GROWTH

## 2021-12-14 ENCOUNTER — VIRTUAL VISIT (OUTPATIENT)
Dept: UROLOGY | Facility: CLINIC | Age: 26
End: 2021-12-14
Attending: ADVANCED PRACTICE MIDWIFE
Payer: COMMERCIAL

## 2021-12-14 VITALS — BODY MASS INDEX: 19.62 KG/M2 | HEIGHT: 67 IN | WEIGHT: 125 LBS

## 2021-12-14 DIAGNOSIS — R39.15 URINARY URGENCY: ICD-10-CM

## 2021-12-14 DIAGNOSIS — N39.498 OTHER URINARY INCONTINENCE: ICD-10-CM

## 2021-12-14 DIAGNOSIS — R35.0 URINARY FREQUENCY: ICD-10-CM

## 2021-12-14 PROCEDURE — 99204 OFFICE O/P NEW MOD 45 MIN: CPT | Mod: GT | Performed by: UROLOGY

## 2021-12-14 ASSESSMENT — PAIN SCALES - GENERAL: PAINLEVEL: NO PAIN (0)

## 2021-12-14 ASSESSMENT — MIFFLIN-ST. JEOR: SCORE: 1349.63

## 2021-12-14 NOTE — PATIENT INSTRUCTIONS
Please do the urine tests    Websites with free information:    [employer] patient website: www.[email or web address]    Total Control Program: www.totalcontrolprogram.com    It was a pleasure meeting with you today.  Thank you for allowing me and my team the privilege of caring for you today.  YOU are the reason we are here, and I truly hope we provided you with the excellent service you deserve.  Please let us know if there is anything else we can do for you so that we can be sure you are leaving completely satisfied with your care experience.

## 2021-12-14 NOTE — LETTER
12/14/2021       RE: Tami Ayala  [address]  [address]  [facility] [address]     Dear Colleague,    Thank you for referring your patient, Tami Ayala, to the [facility] [place] at [facility]. Please see a copy of my visit note below.    *SEND LINK TO CELL PHONE*    Tami is a 26 year old who is being evaluated via a billable video visit.      How would you like to obtain your AVS? MyChart  If the video visit is dropped, the invitation should be resent by: Text to cell phone: [phone]  Will anyone else be joining your video visit? No      Video Start Time: 1:50 PM     December 14, 2021    Referring Provider: KAREN Cardoso CN  [address] [facility] [address]  [place] [address]    Primary Care Provider: No Ref-Primary, Physician    Assessment & Plan     Urinary urgency/Urinary frequency  She is frustrated with the new onset of symptoms and unclear etiology. We discussed there are multiple etiologies and would recommend starting with repeat urinalysis and also having ureaplasma/mycoplasma    - Ureaplasma and Mycoplasma Culture; Future  - UA with Microscopic; Future    Other urinary incontinence  She is frustrated with the new onset of symptoms and unclear etiology.  We discussed there are multiple etiologies and would recommend starting with repeat urinalysis and also having ureaplasma/mycoplasma    - Ureaplasma and Mycoplasma Culture; Future  - UA with Microscopic; Future    She is heading to [place] for the holidays so will do the urine and consider follow up in about 2 months.    Return in about 2 months (around 2/14/2022).    10 minutes were spent on this day of the encounter in reviewing the EMR including the recent OBGYN notes, direct patient care, coordination of care and documentation    Ange Meyer MD MPH  (she/her/hers)   of Urology  [facility]    HPI:  Tami Ayala is a 26 year old  female who presents for evaluation of her pelvic floor symptoms.  She has about a year history of urinary urgency frequency.       Denies gross hematuirm,a UTI, pelvic surgery, consitpation    Denies chance of pregnancy.  Denies dyspareuna    Denies history of abuse.  Reports that she feels safe at home    No onset factor that she knows of     Was seeing chiropracter for her back    Past Medical History:   Diagnosis Date     NO ACTIVE PROBLEMS        Past Surgical History:   Procedure Laterality Date     NO HISTORY OF SURGERY         Social History     Socioeconomic History     Marital status: Single     Spouse name: Not on file     Number of children: Not on file     Years of education: Not on file     Highest education level: Not on file   Occupational History     Not on file   Tobacco Use     Smoking status: Never Smoker     Smokeless tobacco: Never Used   Substance and Sexual Activity     Alcohol use: Yes     Drug use: Never     Sexual activity: Yes     Partners: Male     Birth control/protection: Pill, Condom   Other Topics Concern     Parent/sibling w/ CABG, MI or angioplasty before 65F 55M? Not Asked   Social History Narrative     Not on file     Social Determinants of Health     Financial Resource Strain: Not on file   Food Insecurity: Not on file   Transportation Needs: Not on file   Physical Activity: Not on file   Stress: Not on file   Social Connections: Not on file   Intimate Partner Violence: Not on file   Housing Stability: Not on file       Family History   Problem Relation Age of Onset     Thyroid Disease Mother      Hypertension Paternal Grandmother      Diabetes Paternal Grandfather      Breast Cancer Maternal Aunt        ROS    No Known Allergies    Current Outpatient Medications   Medication     norgestimate-ethinyl estradiol (ESTARYLLA) 0.25-35 MG-MCG tablet     No current facility-administered medications for this visit.     GENERAL: healthy, alert and no distress  EYES: Eyes grossly normal to  inspection, conjunctivae and sclerae normal  HENT: normal cephalic/atraumatic.  External ears, nose and mouth without ulcers or lesions.  RESP: no audible wheeze, cough, or visible cyanosis.  No visible retractions or increased work of breathing.  Able to speak fully in complete sentences.  NEURO: Cranial nerves grossly intact, mentation intact and speech normal  PSYCH: mentation appears normal, affect normal/bright, judgement and insight intact, normal speech and appearance well-groomed    CC  Patient Care Team:  No Ref-Primary, Physician as PCP - Ange Chairez MD as MD (Urology)  Yesi Watt APRN CNM as Assigned OBGYN Provider  YESI WATT        Video-Visit Details    Type of service:  Video Visit    Video End Time:1:58 PM    Originating Location (pt. Location): Other car (she is in [place])    Distant Location (provider location):  [facility]OLOG[facility] [place]     Platform used for Video Visit: Alexi

## 2021-12-14 NOTE — PROGRESS NOTES
*SEND LINK TO CELL PHONE*    Tami is a 26 year old who is being evaluated via a billable video visit.      How would you like to obtain your AVS? MyChart  If the video visit is dropped, the invitation should be resent by: Text to cell phone: [phone]  Will anyone else be joining your video visit? No      Video Start Time: 1:50 PM     December 14, 2021    Referring Provider: KAREN Cardoso [facility]  [address] [facility] [address]  [place] [address]    Primary Care Provider: No Ref-Primary, Physician    Assessment & Plan     Urinary urgency/Urinary frequency  She is frustrated with the new onset of symptoms and unclear etiology. We discussed there are multiple etiologies and would recommend starting with repeat urinalysis and also having ureaplasma/mycoplasma    - Ureaplasma and Mycoplasma Culture; Future  - UA with Microscopic; Future    Other urinary incontinence  She is frustrated with the new onset of symptoms and unclear etiology.  We discussed there are multiple etiologies and would recommend starting with repeat urinalysis and also having ureaplasma/mycoplasma    - Ureaplasma and Mycoplasma Culture; Future  - UA with Microscopic; Future    She is heading to [place] for the holidays so will do the urine and consider follow up in about 2 months.    Return in about 2 months (around 2/14/2022).    10 minutes were spent on this day of the encounter in reviewing the EMR including the recent OBGYN notes, direct patient care, coordination of care and documentation    Ange Meyer MD MPH  (she/her/hers)   of Urology  [facility]    HPI:  Tami Ayala is a 26 year old female who presents for evaluation of her pelvic floor symptoms.  She has about a year history of urinary urgency frequency.       Denies gross hematuirm,a UTI, pelvic surgery, consitpation    Denies chance of pregnancy.  Denies dyspareuna    Denies history of abuse.  Reports that she feels safe at home    No onset  factor that she knows of     Was seeing chiropracter for her back    Past Medical History:   Diagnosis Date     NO ACTIVE PROBLEMS        Past Surgical History:   Procedure Laterality Date     NO HISTORY OF SURGERY         Social History     Socioeconomic History     Marital status: Single     Spouse name: Not on file     Number of children: Not on file     Years of education: Not on file     Highest education level: Not on file   Occupational History     Not on file   Tobacco Use     Smoking status: Never Smoker     Smokeless tobacco: Never Used   Substance and Sexual Activity     Alcohol use: Yes     Drug use: Never     Sexual activity: Yes     Partners: Male     Birth control/protection: Pill, Condom   Other Topics Concern     Parent/sibling w/ CABG, MI or angioplasty before 65F 55M? Not Asked   Social History Narrative     Not on file     Social Determinants of Health     Financial Resource Strain: Not on file   Food Insecurity: Not on file   Transportation Needs: Not on file   Physical Activity: Not on file   Stress: Not on file   Social Connections: Not on file   Intimate Partner Violence: Not on file   Housing Stability: Not on file       Family History   Problem Relation Age of Onset     Thyroid Disease Mother      Hypertension Paternal Grandmother      Diabetes Paternal Grandfather      Breast Cancer Maternal Aunt        ROS    No Known Allergies    Current Outpatient Medications   Medication     norgestimate-ethinyl estradiol (ESTARYLLA) 0.25-35 MG-MCG tablet     No current facility-administered medications for this visit.     GENERAL: healthy, alert and no distress  EYES: Eyes grossly normal to inspection, conjunctivae and sclerae normal  HENT: normal cephalic/atraumatic.  External ears, nose and mouth without ulcers or lesions.  RESP: no audible wheeze, cough, or visible cyanosis.  No visible retractions or increased work of breathing.  Able to speak fully in complete sentences.  NEURO: Cranial nerves  grossly intact, mentation intact and speech normal  PSYCH: mentation appears normal, affect normal/bright, judgement and insight intact, normal speech and appearance well-groomed    CC  Patient Care Team:  No Ref-Primary, Physician as PCP - General  Ange Meyer MD as MD (Urology)  Yesi Watt APRN CNM as Assigned OBGYN Provider  YESI WATT        Video-Visit Details    Type of service:  Video Visit    Video End Time:1:58 PM    Originating Location (pt. Location): Other car (she is in [place])    Distant Location (provider location):  [facility] [place]     Platform used for Video Visit: [employer]

## 2022-06-24 DIAGNOSIS — Z30.011 OCP (ORAL CONTRACEPTIVE PILLS) INITIATION: ICD-10-CM

## 2022-06-24 RX ORDER — NORGESTIMATE AND ETHINYL ESTRADIOL 0.25-0.035
1 KIT ORAL DAILY
Qty: 28 TABLET | Refills: 0 | Status: SHIPPED | OUTPATIENT
Start: 2022-06-24 | End: 2022-09-01

## 2022-06-24 NOTE — TELEPHONE ENCOUNTER
"Requested Prescriptions   Pending Prescriptions Disp Refills     norgestimate-ethinyl estradiol (ESTARYLLA) 0.25-35 MG-MCG tablet 140 tablet 3     Sig: Take 1 tablet by mouth daily Skip placebo weeks.       Contraceptives Protocol Passed - 6/24/2022 11:58 AM        Passed - Patient is not a current smoker if age is 35 or older        Passed - Recent (12 mo) or future (30 days) visit within the authorizing provider's specialty     Patient has had an office visit with the authorizing provider or a provider within the authorizing providers department within the previous 12 mos or has a future within next 30 days. See \"Patient Info\" tab in inbasket, or \"Choose Columns\" in Meds & Orders section of the refill encounter.              Passed - Medication is active on med list        Passed - No active pregnancy on record        Passed - No positive pregnancy test in past 12 months             Last Written Prescription Date:  6/24/2021  Last Fill Quantity: 140,  # refills: 3   Last office visit: 6/24/2021 with prescribing provider:  Kelsi Gongora   Future Office Visit:  None        Prescription approved per [facility] Refill Protocol.    Biacna Moody RN        "

## 2022-07-25 DIAGNOSIS — Z30.011 OCP (ORAL CONTRACEPTIVE PILLS) INITIATION: ICD-10-CM

## 2022-07-25 RX ORDER — NORGESTIMATE AND ETHINYL ESTRADIOL 0.25-0.035
1 KIT ORAL DAILY
Qty: 28 TABLET | Refills: 0 | OUTPATIENT
Start: 2022-07-25

## 2022-07-25 NOTE — TELEPHONE ENCOUNTER
"Requested Prescriptions   Pending Prescriptions Disp Refills     norgestimate-ethinyl estradiol (ESTARYLLA) 0.25-35 MG-MCG tablet 28 tablet 0     Sig: Take 1 tablet by mouth daily Skip placebo weeks. Due for follow up office visit       Contraceptives Protocol Passed - 7/25/2022  3:46 PM        Passed - Patient is not a current smoker if age is 35 or older        Passed - Recent (12 mo) or future (30 days) visit within the authorizing provider's specialty     Patient has had an office visit with the authorizing provider or a provider within the authorizing providers department within the previous 12 mos or has a future within next 30 days. See \"Patient Info\" tab in inbasket, or \"Choose Columns\" in Meds & Orders section of the refill encounter.              Passed - Medication is active on med list        Passed - No active pregnancy on record        Passed - No positive pregnancy test in past 12 months           Last Written Prescription Date:  6/24/22  Last Fill Quantity: 28,  # refills: 0   Last office visit: Visit date not found with prescribing provider:  Eryn Chowdary  Future Office Visit:      Last OV:11/17/21   Last annual:1/3/2020      Pt due for annual, no appt scheduled. Pt already received one month extension. Rx denied.     Bianca Moody RN    "

## 2022-07-26 DIAGNOSIS — Z30.011 OCP (ORAL CONTRACEPTIVE PILLS) INITIATION: ICD-10-CM

## 2022-07-27 RX ORDER — NORGESTIMATE AND ETHINYL ESTRADIOL 0.25-0.035
1 KIT ORAL DAILY
Qty: 28 TABLET | Refills: 0 | OUTPATIENT
Start: 2022-07-27

## 2022-08-09 DIAGNOSIS — Z30.011 OCP (ORAL CONTRACEPTIVE PILLS) INITIATION: ICD-10-CM

## 2022-08-09 RX ORDER — NORGESTIMATE AND ETHINYL ESTRADIOL 0.25-0.035
1 KIT ORAL DAILY
Qty: 28 TABLET | Refills: 0 | OUTPATIENT
Start: 2022-08-09

## 2022-08-09 NOTE — TELEPHONE ENCOUNTER
"  Requested Prescriptions   Pending Prescriptions Disp Refills     norgestimate-ethinyl estradiol (ESTARYLLA) 0.25-35 MG-MCG tablet 28 tablet 0     Sig: Take 1 tablet by mouth daily Skip placebo weeks. Due for follow up office visit       Contraceptives Protocol Passed - 8/9/2022  3:26 PM        Passed - Patient is not a current smoker if age is 35 or older        Passed - Recent (12 mo) or future (30 days) visit within the authorizing provider's specialty     Patient has had an office visit with the authorizing provider or a provider within the authorizing providers department within the previous 12 mos or has a future within next 30 days. See \"Patient Info\" tab in inbasket, or \"Choose Columns\" in Meds & Orders section of the refill encounter.              Passed - Medication is active on med list        Passed - No active pregnancy on record        Passed - No positive pregnancy test in past 12 months           Last Written Prescription Date:  6/24/22  Last Fill Quantity: 28,  # refills: 0   Last office visit: Visit date not found with prescribing provider:  Eryn Chowdary   Future Office Visit:            "

## 2022-08-09 NOTE — TELEPHONE ENCOUNTER
6/24/2021 annual pablo Gongora    Pt due for annual, no appt scheduled. Pt already received one month extension. Rx denied.   Kelsi Dooley RN on 8/9/2022 at 3:55 PM

## 2022-08-23 DIAGNOSIS — Z30.011 OCP (ORAL CONTRACEPTIVE PILLS) INITIATION: ICD-10-CM

## 2022-08-23 RX ORDER — NORGESTIMATE AND ETHINYL ESTRADIOL 0.25-0.035
1 KIT ORAL DAILY
Qty: 28 TABLET | Refills: 0 | OUTPATIENT
Start: 2022-08-23

## 2022-08-23 NOTE — TELEPHONE ENCOUNTER
"Requested Prescriptions   Pending Prescriptions Disp Refills     norgestimate-ethinyl estradiol (ESTARYLLA) 0.25-35 MG-MCG tablet 28 tablet 0     Sig: Take 1 tablet by mouth daily Skip placebo weeks. Due for follow up office visit       Contraceptives Protocol Passed - 8/23/2022  3:08 PM        Passed - Patient is not a current smoker if age is 35 or older        Passed - Recent (12 mo) or future (30 days) visit within the authorizing provider's specialty     Patient has had an office visit with the authorizing provider or a provider within the authorizing providers department within the previous 12 mos or has a future within next 30 days. See \"Patient Info\" tab in inbasket, or \"Choose Columns\" in Meds & Orders section of the refill encounter.              Passed - Medication is active on med list        Passed - No active pregnancy on record        Passed - No positive pregnancy test in past 12 months             Last Written Prescription Date:  08/09/2022  Last Fill Quantity: 28,  # refills: 0   Last office visit: Visit date not found with prescribing provider:  Eryn Chowdary   Future Office Visit:      Pt due for annual, no appt scheduled. Pt already received one month extension. Rx denied.   Petra Fong RN on 8/23/2022 at 3:13 PM      "

## 2022-09-01 RX ORDER — NORGESTIMATE AND ETHINYL ESTRADIOL 0.25-0.035
1 KIT ORAL DAILY
Qty: 28 TABLET | Refills: 0 | Status: SHIPPED | OUTPATIENT
Start: 2022-09-01 | End: 2022-09-19

## 2022-09-01 NOTE — TELEPHONE ENCOUNTER
Prescription approved per [facility] Refill Protocol.  Kelsi Dooley RN on 9/1/2022 at 8:59 AM

## 2022-09-14 NOTE — PROGRESS NOTES
Tami is a 27 year old  female who presents for annual exam.     Besides routine health maintenance, she has no other health concerns today .  HPI:  Here for routine annual exam.   Menses are regular q 28-30 days and normal and regular lasting 5 days.   Menses flow: normal.    Patient's last menstrual period was 2022 (exact date).   Using oral contraceptives and condoms for contraception.    She is not currently considering pregnancy.    REPRODUCTIVE/GYNECOLOGIC HISTORY:  Tami is sexually active with male partner(s) and is currently in monogamous relationship.   STI testing offered?  Accepted Swabs today. Would like to return for blood serum screening at a future date  History of abnormal Pap smear:  No  SOCIAL HISTORY  Abuse: does not report having previously been physical or sexually abused.    Do you feel safe in your environment? Yes    She  reports that she has never smoked. She has never used smokeless tobacco.      Last PHQ-9 score on record =   PHQ-9 SCORE 2022   PHQ-9 Total Score 0     Last GAD7 score on record =   ILIANA-7 SCORE 2022   Total Score 6     Alcohol Score =     HEALTH MAINTENANCE:  Cholesterol: NA  Mammo: NA . History of abnormal   Regular Self Breast Exams: No  TSH: (No results found for: TSH )  Pap; (  Lab Results   Component Value Date    PAP NIL 2020      Immunizations up to date: no  (Gardasil, Tdap, Flu) Would like later  Health maintenance updated:  yes    HEALTHY HABITS  Eating habits: eats regular meals, follows a balanced nutrition diet and takes daily vitamins  Calcium/Vitamin D intake: source: Yes gets  Exercise: How often do you exercise? Yes 3-5 times/week; Pilates  Have you had an eye exam in the last two years? YES    Do you routinely see the dentist once or twice yearly? YES       HISTORY:  OB History    Para Term  AB Living   0 0 0 0 0 0   SAB IAB Ectopic Multiple Live Births   0 0 0 0 0     Past Medical History:   Diagnosis Date     NO  "ACTIVE PROBLEMS      Past Surgical History:   Procedure Laterality Date     NO HISTORY OF SURGERY       Family History   Problem Relation Age of Onset     Thyroid Disease Mother      Hypertension Paternal Grandmother      Diabetes Paternal Grandfather      Breast Cancer Maternal Aunt      Social History     Socioeconomic History     Marital status: Single   Tobacco Use     Smoking status: Never Smoker     Smokeless tobacco: Never Used   Substance and Sexual Activity     Alcohol use: Yes     Drug use: Never     Sexual activity: Yes     Partners: Male     Birth control/protection: Pill, Condom       Current Outpatient Medications:      norgestimate-ethinyl estradiol (ESTARYLLA) 0.25-35 MG-MCG tablet, Take 1 tablet by mouth daily Skip placebo weeks. Due for follow up office visit, Disp: 90 tablet, Rfl: 3   No Known Allergies    Past medical, surgical, social and family history were reviewed and updated in EPIC.    ROS:   12 point review of systems negative other than symptoms noted below or in the HPI.    PHYSICAL EXAM:  /62   Pulse 86   Ht 1.702 m (5' 7\")   Wt 54.4 kg (120 lb)   LMP 08/31/2022 (Exact Date)   Breastfeeding No   BMI 18.79 kg/m     BMI: Body mass index is 18.79 kg/m .  Constitutional: healthy, alert and no distress  Neck: symmetrical, thyroid normal size, no masses present, no lymphadenopathy present.   Cardiovascular: RRR, no murmurs present  Respiratory: Breathing unlabored, lungs CTA bilaterally  Breast:Normal without masses, tenderness or nipple discharge and no palpable axillary masses or adenopathy  Gastrointestinal: Abdomen soft, non-tender, bowel sounds present  PELVIC EXAM:  Vulva: No lesions, no adenopathy, BUS WNL  Vagina: Moist, pink, discharge normal  well rugated, no lesions  Cervix:smooth, pink, no visible lesions  Uterus: Normal size, non-tender, mobile  Ovaries: No masses palpated  Rectal exam: deferred    ASSESSMENT/PLAN:    ICD-10-CM    1. Screen for STD (sexually " transmitted disease)  Z11.3 HIV Antigen Antibody Combo     Treponema Abs w Reflex to RPR and Titer     Hepatitis C antibody     Hepatitis B surface antigen     Wet prep - Clinic Collect     NEISSERIA GONORRHOEA PCR     CHLAMYDIA TRACHOMATIS PCR     CHLAMYDIA TRACHOMATIS PCR     NEISSERIA GONORRHOEA PCR   2. OCP (oral contraceptive pills) initiation  Z30.011 norgestimate-ethinyl estradiol (ESTARYLLA) 0.25-35 MG-MCG tablet   3. Preventative health care  Z00.00 CBC with platelets     TSH with free T4 reflex     Results for orders placed or performed in visit on 09/19/22   Wet prep - Clinic Collect     Status: Abnormal    Specimen: Vagina; Swab   Result Value Ref Range    Trichomonas Absent Absent    Yeast Absent Absent    Clue Cells Present (A) Absent    WBCs/high power field 1+ (A) None         COUNSELING:   Reviewed preventive health counseling, as reflected in patient instructions       Contraception       Safe sex practices/STD prevention   reports that she has never smoked. She has never used smokeless tobacco.  Would like to return in near future for her Pap and blood serum STD screening  Requests CBC screening and accepts TSH screening  Prescription refill for birth control x 1 year  Declines flu and covid vaccine today. Interested in starting Gardasil series at next appt when she returns for Pap  Due for pap 1/2023. May return sooner. Encouraged to check with insurance for coverage     30 minutes spent on the date of the encounter doing chart review, review of test results, patient visit and documentation       Yesi JONES CNM

## 2022-09-18 ENCOUNTER — HEALTH MAINTENANCE LETTER (OUTPATIENT)
Age: 27
End: 2022-09-18

## 2022-09-19 ENCOUNTER — OFFICE VISIT (OUTPATIENT)
Dept: MIDWIFE SERVICES | Facility: CLINIC | Age: 27
End: 2022-09-19
Payer: COMMERCIAL

## 2022-09-19 VITALS
WEIGHT: 120 LBS | DIASTOLIC BLOOD PRESSURE: 62 MMHG | HEART RATE: 86 BPM | BODY MASS INDEX: 18.83 KG/M2 | SYSTOLIC BLOOD PRESSURE: 100 MMHG | HEIGHT: 67 IN

## 2022-09-19 DIAGNOSIS — Z11.3 SCREEN FOR STD (SEXUALLY TRANSMITTED DISEASE): Primary | ICD-10-CM

## 2022-09-19 DIAGNOSIS — Z30.011 OCP (ORAL CONTRACEPTIVE PILLS) INITIATION: ICD-10-CM

## 2022-09-19 DIAGNOSIS — N76.0 BACTERIAL VAGINOSIS: ICD-10-CM

## 2022-09-19 DIAGNOSIS — Z00.00 PREVENTATIVE HEALTH CARE: ICD-10-CM

## 2022-09-19 DIAGNOSIS — B96.89 BACTERIAL VAGINOSIS: ICD-10-CM

## 2022-09-19 LAB
CLUE CELLS: PRESENT
TRICHOMONAS, WET PREP: ABNORMAL
WBC'S/HIGH POWER FIELD, WET PREP: ABNORMAL
YEAST, WET PREP: ABNORMAL

## 2022-09-19 PROCEDURE — 87491 CHLMYD TRACH DNA AMP PROBE: CPT | Performed by: ADVANCED PRACTICE MIDWIFE

## 2022-09-19 PROCEDURE — 99395 PREV VISIT EST AGE 18-39: CPT | Performed by: ADVANCED PRACTICE MIDWIFE

## 2022-09-19 PROCEDURE — 87591 N.GONORRHOEAE DNA AMP PROB: CPT | Performed by: ADVANCED PRACTICE MIDWIFE

## 2022-09-19 PROCEDURE — 87210 SMEAR WET MOUNT SALINE/INK: CPT | Performed by: ADVANCED PRACTICE MIDWIFE

## 2022-09-19 RX ORDER — METRONIDAZOLE 500 MG/1
500 TABLET ORAL 2 TIMES DAILY
Qty: 14 TABLET | Refills: 0 | Status: SHIPPED | OUTPATIENT
Start: 2022-09-19 | End: 2022-09-26

## 2022-09-19 RX ORDER — NORGESTIMATE AND ETHINYL ESTRADIOL 0.25-0.035
1 KIT ORAL DAILY
Qty: 90 TABLET | Refills: 3 | Status: SHIPPED | OUTPATIENT
Start: 2022-09-19 | End: 2023-12-26

## 2022-09-19 ASSESSMENT — ANXIETY QUESTIONNAIRES
5. BEING SO RESTLESS THAT IT IS HARD TO SIT STILL: SEVERAL DAYS
1. FEELING NERVOUS, ANXIOUS, OR ON EDGE: SEVERAL DAYS
7. FEELING AFRAID AS IF SOMETHING AWFUL MIGHT HAPPEN: NOT AT ALL
3. WORRYING TOO MUCH ABOUT DIFFERENT THINGS: SEVERAL DAYS
GAD7 TOTAL SCORE: 6
6. BECOMING EASILY ANNOYED OR IRRITABLE: SEVERAL DAYS
IF YOU CHECKED OFF ANY PROBLEMS ON THIS QUESTIONNAIRE, HOW DIFFICULT HAVE THESE PROBLEMS MADE IT FOR YOU TO DO YOUR WORK, TAKE CARE OF THINGS AT HOME, OR GET ALONG WITH OTHER PEOPLE: SOMEWHAT DIFFICULT
GAD7 TOTAL SCORE: 6
2. NOT BEING ABLE TO STOP OR CONTROL WORRYING: SEVERAL DAYS

## 2022-09-19 ASSESSMENT — PATIENT HEALTH QUESTIONNAIRE - PHQ9
5. POOR APPETITE OR OVEREATING: SEVERAL DAYS
SUM OF ALL RESPONSES TO PHQ QUESTIONS 1-9: 0

## 2022-09-19 NOTE — TELEPHONE ENCOUNTER
"Requested Prescriptions   Pending Prescriptions Disp Refills     norgestimate-ethinyl estradiol (ESTARYLLA) 0.25-35 MG-MCG tablet 90 tablet 3     Sig: Take 1 tablet by mouth daily Skip placebo weeks. Due for follow up office visit       Contraceptives Protocol Passed - 9/19/2022  3:56 PM        Passed - Patient is not a current smoker if age is 35 or older        Passed - Recent (12 mo) or future (30 days) visit within the authorizing provider's specialty     Patient has had an office visit with the authorizing provider or a provider within the authorizing providers department within the previous 12 mos or has a future within next 30 days. See \"Patient Info\" tab in inbasket, or \"Choose Columns\" in Meds & Orders section of the refill encounter.              Passed - Medication is active on med list        Passed - No active pregnancy on record        Passed - No positive pregnancy test in past 12 months           Last Written Prescription Date:  9/19/22  Last Fill Quantity: 90,  # refills: 3  Last office visit: Visit date not found with prescribing provider:  Yesi Burnett  Future Office Visit:      Refills available  Petra Fong RN on 9/20/2022 at 9:32 AM        "

## 2022-09-20 LAB
C TRACH DNA SPEC QL NAA+PROBE: NEGATIVE
N GONORRHOEA DNA SPEC QL NAA+PROBE: NEGATIVE

## 2022-09-20 RX ORDER — NORGESTIMATE AND ETHINYL ESTRADIOL 0.25-0.035
1 KIT ORAL DAILY
Qty: 90 TABLET | Refills: 3 | OUTPATIENT
Start: 2022-09-20

## 2023-12-17 ENCOUNTER — HEALTH MAINTENANCE LETTER (OUTPATIENT)
Age: 28
End: 2023-12-17

## 2023-12-26 DIAGNOSIS — Z30.011 OCP (ORAL CONTRACEPTIVE PILLS) INITIATION: ICD-10-CM

## 2023-12-26 RX ORDER — NORGESTIMATE AND ETHINYL ESTRADIOL 0.25-0.035
KIT ORAL
Qty: 30 TABLET | Refills: 0 | Status: SHIPPED | OUTPATIENT
Start: 2023-12-26

## 2023-12-26 NOTE — TELEPHONE ENCOUNTER
Requested Prescriptions   Pending Prescriptions Disp Refills    JUVENCIO 0.25-35 MG-MCG tablet [Pharmacy Med Name: JUVENCIO TABLETS 28S] 84 tablet      Sig: TAKE 1 TABLET BY MOUTH DAILY. SKIP PLACEBO WEEKS. FOLLOW UP OFFICE VISIT       Contraceptives Protocol Failed - 12/26/2023 12:58 PM        Failed - Recent (12 mo) or future (30 days) visit within the authorizing provider's specialty     The patient must have completed an in-person or virtual visit within the past 12 months or has a future visit scheduled within the next 90 days with the authorizing provider s specialty.  Urgent care and e-visits do not quality as an office visit for this protocol.          Passed - Patient is not a current smoker if age is 35 or older        Passed - Medication is active on med list        Passed - No active pregnancy on record        Passed - No positive pregnancy test in past 12 months           One month approved  Appointment needed for further refills  Petra Fong RN on 12/26/2023 at 1:58 PM

## 2024-01-28 DIAGNOSIS — Z30.011 OCP (ORAL CONTRACEPTIVE PILLS) INITIATION: ICD-10-CM

## 2024-01-29 RX ORDER — NORGESTIMATE AND ETHINYL ESTRADIOL 0.25-0.035
KIT ORAL
Qty: 28 TABLET | OUTPATIENT
Start: 2024-01-29

## 2024-01-29 NOTE — TELEPHONE ENCOUNTER
Requested Prescriptions   Pending Prescriptions Disp Refills    ESTARYLLA 0.25-35 MG-MCG tablet [Pharmacy Med Name: ESTARYLLA TABLETS 28S] 28 tablet      Sig: TAKE 1 TABLET BY MOUTH DAILY. SKIP PLACEBO WEEKS. FOLLOW UP OFFICE VISIT       Contraceptives Protocol Failed - 1/28/2024  3:16 AM        Failed - Recent (12 mo) or future (30 days) visit within the authorizing provider's specialty     The patient must have completed an in-person or virtual visit within the past 12 months or has a future visit scheduled within the next 90 days with the authorizing provider s specialty.  Urgent care and e-visits do not quality as an office visit for this protocol.          Passed - Patient is not a current smoker if age is 35 or older        Passed - Medication is active on med list        Passed - No active pregnancy on record        Passed - No positive pregnancy test in past 12 months           Last Written Prescription Date:  12/26/23  Last Fill Quantity: 30,  # refills: 0   Last office visit: 9/19/2022 ; last virtual visit: Visit date not found with prescribing provider:  Lex   Future Office Visit:  NONE    Pt due for annual, no appt scheduled. Pt already received one month extension. Rx denied.   Tiarra Capps RN on 1/29/2024 at 6:32 AM

## 2025-01-12 ENCOUNTER — HEALTH MAINTENANCE LETTER (OUTPATIENT)
Age: 30
End: 2025-01-12